# Patient Record
Sex: MALE | Race: WHITE | NOT HISPANIC OR LATINO
[De-identification: names, ages, dates, MRNs, and addresses within clinical notes are randomized per-mention and may not be internally consistent; named-entity substitution may affect disease eponyms.]

---

## 2023-07-28 PROBLEM — Z00.00 ENCOUNTER FOR PREVENTIVE HEALTH EXAMINATION: Status: ACTIVE | Noted: 2023-07-28

## 2023-07-29 ENCOUNTER — NON-APPOINTMENT (OUTPATIENT)
Age: 48
End: 2023-07-29

## 2023-08-07 ENCOUNTER — APPOINTMENT (OUTPATIENT)
Dept: SURGERY | Facility: CLINIC | Age: 48
End: 2023-08-07
Payer: COMMERCIAL

## 2023-08-07 ENCOUNTER — NON-APPOINTMENT (OUTPATIENT)
Age: 48
End: 2023-08-07

## 2023-08-07 VITALS
BODY MASS INDEX: 29.96 KG/M2 | TEMPERATURE: 98 F | WEIGHT: 246 LBS | HEIGHT: 76 IN | SYSTOLIC BLOOD PRESSURE: 120 MMHG | DIASTOLIC BLOOD PRESSURE: 81 MMHG | HEART RATE: 89 BPM

## 2023-08-07 DIAGNOSIS — Z86.39 PERSONAL HISTORY OF OTHER ENDOCRINE, NUTRITIONAL AND METABOLIC DISEASE: ICD-10-CM

## 2023-08-07 DIAGNOSIS — Z86.79 PERSONAL HISTORY OF OTHER DISEASES OF THE CIRCULATORY SYSTEM: ICD-10-CM

## 2023-08-07 PROCEDURE — 99203 OFFICE O/P NEW LOW 30 MIN: CPT

## 2023-08-07 RX ORDER — ROSUVASTATIN CALCIUM 10 MG/1
10 TABLET, FILM COATED ORAL
Refills: 0 | Status: ACTIVE | COMMUNITY

## 2023-08-07 RX ORDER — TADALAFIL 10 MG/1
10 TABLET, FILM COATED ORAL
Refills: 0 | Status: ACTIVE | COMMUNITY

## 2023-08-07 RX ORDER — VALSARTAN 80 MG/1
80 TABLET, COATED ORAL
Refills: 0 | Status: ACTIVE | COMMUNITY

## 2023-08-07 RX ORDER — MELOXICAM 15 MG/1
15 TABLET ORAL
Refills: 0 | Status: ACTIVE | COMMUNITY

## 2023-08-07 NOTE — ASSESSMENT
[FreeTextEntry1] : 46 yo M with symptomatic umbilical hernia, partially incarcerated.  I have recommended an open umbilical hernia repair with mesh. We had a long discussion about the risks and benefits of hernia repair including bleeding, infection, mesh migration, bowel injury, and hernia recurrence. We discussed the expected recovery. We also discussed the risk of bowel incarceration if the hernia is not repaired.  The patient understands and accepts these risks and would like to proceed. Surgery will be performed at [facility] as an ambulatory procedure under general anesthesia.  Patient will obtain medical clearance prior to surgery.

## 2023-08-07 NOTE — HISTORY OF PRESENT ILLNESS
[de-identified] : 46 yo M with HTN, HL, presents for enlarging and more symptomatic umbilical hernia. He is able to push it in sometimes but it pops right back out. He denies any bowel issues related to the hernia. No skin changes or drainage.

## 2023-08-07 NOTE — PHYSICAL EXAM
[Respiratory Effort] : normal respiratory effort [Normal Rate and Rhythm] : normal rate and rhythm [No Rash or Lesion] : No rash or lesion [Alert] : alert [Calm] : calm [de-identified] : NAD, well-appearing [de-identified] : anicteric [de-identified] : umbilical hernia, partially reducible; no overlying skin changes, defect 1-2cm; otherwise, soft, nondistended

## 2023-08-07 NOTE — CONSULT LETTER
[Dear  ___] : Dear  [unfilled], [( Thank you for referring [unfilled] for consultation for _____ )] : Thank you for referring [unfilled] for consultation for [unfilled] [Please see my note below.] : Please see my note below. [Consult Closing:] : Thank you very much for allowing me to participate in the care of this patient.  If you have any questions, please do not hesitate to contact me. [Sincerely,] : Sincerely, [FreeTextEntry3] : Maribel Merida MD

## 2023-08-21 ENCOUNTER — TRANSCRIPTION ENCOUNTER (OUTPATIENT)
Age: 48
End: 2023-08-21

## 2023-09-08 ENCOUNTER — TRANSCRIPTION ENCOUNTER (OUTPATIENT)
Age: 48
End: 2023-09-08

## 2023-09-08 ENCOUNTER — APPOINTMENT (OUTPATIENT)
Dept: SURGERY | Facility: HOSPITAL | Age: 48
End: 2023-09-08

## 2023-09-27 ENCOUNTER — APPOINTMENT (OUTPATIENT)
Dept: SURGERY | Facility: CLINIC | Age: 48
End: 2023-09-27
Payer: COMMERCIAL

## 2023-09-27 VITALS — HEART RATE: 89 BPM | DIASTOLIC BLOOD PRESSURE: 87 MMHG | SYSTOLIC BLOOD PRESSURE: 123 MMHG

## 2023-09-27 DIAGNOSIS — K42.0 UMBILICAL HERNIA WITH OBSTRUCTION, W/OUT GANGRENE: ICD-10-CM

## 2023-09-27 PROCEDURE — 99212 OFFICE O/P EST SF 10 MIN: CPT
